# Patient Record
Sex: MALE | Race: OTHER | Employment: OTHER | URBAN - METROPOLITAN AREA
[De-identification: names, ages, dates, MRNs, and addresses within clinical notes are randomized per-mention and may not be internally consistent; named-entity substitution may affect disease eponyms.]

---

## 2017-01-13 ENCOUNTER — OFFICE VISIT (OUTPATIENT)
Dept: FAMILY MEDICINE CLINIC | Age: 37
End: 2017-01-13

## 2017-01-13 ENCOUNTER — HOSPITAL ENCOUNTER (OUTPATIENT)
Dept: GENERAL RADIOLOGY | Age: 37
Discharge: HOME | End: 2017-01-13
Payer: SUBSIDIZED

## 2017-01-13 VITALS
BODY MASS INDEX: 28.38 KG/M2 | HEART RATE: 62 BPM | WEIGHT: 191.6 LBS | TEMPERATURE: 97.8 F | SYSTOLIC BLOOD PRESSURE: 131 MMHG | DIASTOLIC BLOOD PRESSURE: 82 MMHG | HEIGHT: 69 IN

## 2017-01-13 DIAGNOSIS — Z13.9 SCREENING: ICD-10-CM

## 2017-01-13 DIAGNOSIS — M54.40 LOW BACK PAIN WITH SCIATICA, SCIATICA LATERALITY UNSPECIFIED, UNSPECIFIED BACK PAIN LATERALITY, UNSPECIFIED CHRONICITY: ICD-10-CM

## 2017-01-13 DIAGNOSIS — M54.40 LOW BACK PAIN WITH SCIATICA, SCIATICA LATERALITY UNSPECIFIED, UNSPECIFIED BACK PAIN LATERALITY, UNSPECIFIED CHRONICITY: Primary | ICD-10-CM

## 2017-01-13 DIAGNOSIS — Z71.89 COUNSELING AND COORDINATION OF CARE: Primary | ICD-10-CM

## 2017-01-13 LAB — HGB BLD-MCNC: 14.8 G/DL

## 2017-01-13 PROCEDURE — 72100 X-RAY EXAM L-S SPINE 2/3 VWS: CPT

## 2017-01-13 RX ORDER — CYCLOBENZAPRINE HCL 10 MG
10 TABLET ORAL
Qty: 30 TAB | Refills: 1 | Status: SHIPPED | OUTPATIENT
Start: 2017-01-13 | End: 2017-01-19

## 2017-01-13 RX ORDER — DICLOFENAC SODIUM 75 MG/1
75 TABLET, DELAYED RELEASE ORAL 2 TIMES DAILY
Qty: 60 TAB | Refills: 1 | Status: SHIPPED | OUTPATIENT
Start: 2017-01-13 | End: 2017-02-21 | Stop reason: SDUPTHER

## 2017-01-13 NOTE — MR AVS SNAPSHOT
Visit Information Date & Time Provider Department Dept. Phone Encounter #  
 1/13/2017  9:00 AM Misti Hill MD [facility] [phone] [id number] Follow-up Instructions Return in about 4 weeks (around 2/10/2017) for appt. Upcoming Health Maintenance Date Due DTaP/Tdap/Td series (1 - Tdap) 9/2/2002 INFLUENZA AGE 9 TO ADULT 8/1/2016 Allergies as of 1/13/2017  Review Complete On: 1/13/2017 By: Aurelio Pyle No Known Allergies Current Immunizations  Never Reviewed No immunizations on file. Not reviewed this visit You Were Diagnosed With   
  
 Codes Comments Low back pain with sciatica, sciatica laterality unspecified, unspecified back pain laterality, unspecified chronicity    -  Primary ICD-10-CM: M54.40 ICD-9-CM: 724.3 Vitals BP Pulse Temp Height(growth percentile) Weight(growth percentile) BMI  
 131/82 (BP 1 Location: Right arm, BP Patient Position: Sitting) 62 97.8 °F (36.6 °C) (Oral) 5' 8.7\" (1.745 m) 191 lb 9.6 oz (86.9 kg) 28.54 kg/m2 Smoking Status Never Smoker Vitals History BMI and BSA Data Body Mass Index Body Surface Area 28.54 kg/m 2 2.05 m 2 Preferred Pharmacy Pharmacy Name Phone [facility] [address] [place] [address] [phone] Your Updated Medication List  
  
   
This list is accurate as of: 1/13/17 10:00 AM.  Always use your most recent med list.  
  
  
  
  
 cyclobenzaprine 10 mg tablet Commonly known as:  FLEXERIL Take 1 Tab by mouth nightly. diclofenac EC 75 mg EC tablet Commonly known as:  VOLTAREN Take 1 Tab by mouth two (2) times a day. Prescriptions Sent to Pharmacy Refills  
 diclofenac EC (VOLTAREN) 75 mg EC tablet 1 Sig: Take 1 Tab by mouth two (2) times a day. Class: Normal  
 Pharmacy: [address] [facility], [address] [facility] #: [phone] Route: Oral  
 cyclobenzaprine (FLEXERIL) 10 mg tablet 1 Sig: Take 1 Tab by mouth nightly. Class: Normal  
 Pharmacy: [address], [address] [facility] [address] Winifred Brice Ph #: [phone] Route: Oral  
  
We Performed the Following REFERRAL TO SPINE SURGERY [AUG277 Custom] Comments:  
 Access Now Referral Request Form: 
 
Has the patient live in the area for 6 months Yes Is the patient here on a visa No 
 
Priority: 
 
3 weeks Location: [facility] Patient Demographics: 
 
Date:  1/13/2017                          EMR# [id number] Jennifer Donnelly 1980 Home Phone : [phone]             Cell Phone:   
 
Language :  [place] Specialist Requested:  Ortho spine Reason for Request:  Severe lbp unresponsive to conservative tx Specialist Requirements: 
 
If GYN Referral:   
Pap: n/a If Ortho Referral: MRI no      
XRAY: yes Pulmonary Referrals must have :  
C-X-ray n/a OR  
CT Scan n/a Referring Provider:  Camron Sterling MD  
  
Follow-up Instructions Return in about 4 weeks (around 2/10/2017) for appt. To-Do List   
 01/13/2017 Imaging:  XR SPINE LUMB 2 OR 3 V Referral Information Referral ID Referred By Referred To  
  
 [id number] Telma Sexton Not Available Visits Status Start Date End Date 1 New Request 1/13/17 1/13/18 If your referral has a status of pending review or denied, additional information will be sent to support the outcome of this decision. Introducing [facility]! Belkis Rodriges introduces [employer] patient portal. Now you can access parts of your medical record, email your doctor's office, and request medication refills online. 1. In your internet browser, go to [email or web address] 2. Click on the First Time User? Click Here link in the Sign In box. You will see the New Member Sign Up page. 3. Enter your [employer] Access Code exactly as it appears below. You will not need to use this code after youve completed the sign-up process. If you do not sign up before the expiration date, you must request a new code. · [employer] Access Code: [email or web address] Expires: 4/13/2017 10:00 AM 
 
4. Enter the last four digits of your Social Security Number (xxxx) and Date of Birth (mm/dd/yyyy) as indicated and click Submit. You will be taken to the next sign-up page. 5. Create a [employer] ID. This will be your [employer] login ID and cannot be changed, so think of one that is secure and easy to remember. 6. Create a [employer] password. You can change your password at any time. 7. Enter your Password Reset Question and Answer. This can be used at a later time if you forget your password. 8. Enter your e-mail address. You will receive e-mail notification when new information is available in [address]. 9. Click Sign Up. You can now view and download portions of your medical record. 10. Click the Download Summary menu link to download a portable copy of your medical information. If you have questions, please visit the Frequently Asked Questions section of the [employer] website. Remember, [employer] is NOT to be used for urgent needs. For medical emergencies, dial 911. Now available from your iPhone and Android! Please provide this summary of care documentation to your next provider. If you have any questions after today's visit, please call [phone].

## 2017-01-13 NOTE — PROGRESS NOTES
HISTORY OF PRESENT ILLNESS  Cesar Sanford is a 29 y.o. male. 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 Initially had episodes of what sounds like spasms lasting several days, then up to 2 weeks, and most recently has had severe pain now since 12/25. He doesn't get any help from naproxen now, has gotten some relief from muscle relaxants, has had PT and chiropractic, acupuncture, none of which provided any long term relief. Works at [employer] of a spine surgeon who told him he should see an ortho as next step, which is what he would like to do. Has LBP pain with erections also recently. Ros + for brbpr recently. .    Back Pain          Review of Systems   Musculoskeletal: Positive for back pain. Physical Exam   Constitutional: He appears well-developed and well-nourished. He appears distressed. Musculoskeletal:   Antalgic gait and doesn't want to sit down. Decreased back rom due to pain, especially lat flexion to the right and extension.  + LSLR. Tender lower lumbar paraspinous muscles mostly on left. ASSESSMENT and PLAN  Severe low back pain, has tried most modalities except epidural injections. Refer to spine surgery. Hematochezia, which sounds to be rectal.  F/u 1 month and will evaluate further. If sx increase, f/u sooner or go to ED.

## 2017-01-13 NOTE — PROGRESS NOTES
Met with Dinesh Schaeffer and completed Access Now application pending income verification.   Diane Frost

## 2017-01-13 NOTE — PROGRESS NOTES
Statements below were documented by Gallito Reynolds discharged home with AVS and Medication teaching . No further questions. Reviewed patient's medications, how to take, where to pickup and if any refills, patient verbalized understanding and has no questionsPatient given information about care card because the test ordered will be billed to patient . Patient explained to answer phone because a call would be coming in from [facility] to start the financial screening process fro Care Card. Patient verbalized understanding and has no questions . Patient explained that when bill is received to please call the number on the bill and explain that they are working on getting the care card. Patient acknowleged and understands the process without aany questions . Patient informed that if [facility] does not call to contact them in 3 weeks  to please give them a call Patient to see registrar to make follow up appointment. Clemente Damon RN

## 2017-01-13 NOTE — PROGRESS NOTES
Results for orders placed or performed in visit on 01/13/17   AMB POC HEMOGLOBIN (HGB)   Result Value Ref Range    Hemoglobin (POC) 14.8

## 2017-01-18 ENCOUNTER — DOCUMENTATION ONLY (OUTPATIENT)
Dept: FAMILY MEDICINE CLINIC | Age: 37
End: 2017-01-18

## 2017-01-19 ENCOUNTER — DOCUMENTATION ONLY (OUTPATIENT)
Dept: FAMILY MEDICINE CLINIC | Age: 37
End: 2017-01-19

## 2017-01-19 ENCOUNTER — HOSPITAL ENCOUNTER (EMERGENCY)
Age: 37
Discharge: HOME | End: 2017-01-19
Attending: EMERGENCY MEDICINE
Payer: SUBSIDIZED

## 2017-01-19 VITALS
DIASTOLIC BLOOD PRESSURE: 79 MMHG | SYSTOLIC BLOOD PRESSURE: 128 MMHG | OXYGEN SATURATION: 100 % | HEART RATE: 71 BPM | WEIGHT: 190 LBS | BODY MASS INDEX: 29.82 KG/M2 | RESPIRATION RATE: 16 BRPM | TEMPERATURE: 97.9 F | HEIGHT: 67 IN

## 2017-01-19 DIAGNOSIS — M54.32 SCIATICA OF LEFT SIDE: Primary | ICD-10-CM

## 2017-01-19 PROCEDURE — 74011250636 HC RX REV CODE- 250/636: Performed by: EMERGENCY MEDICINE

## 2017-01-19 PROCEDURE — 96372 THER/PROPH/DIAG INJ SC/IM: CPT

## 2017-01-19 PROCEDURE — 74011636637 HC RX REV CODE- 636/637: Performed by: EMERGENCY MEDICINE

## 2017-01-19 PROCEDURE — 74011250637 HC RX REV CODE- 250/637: Performed by: EMERGENCY MEDICINE

## 2017-01-19 PROCEDURE — 99283 EMERGENCY DEPT VISIT LOW MDM: CPT

## 2017-01-19 RX ORDER — PREDNISONE 20 MG/1
60 TABLET ORAL
Status: COMPLETED | OUTPATIENT
Start: 2017-01-19 | End: 2017-01-19

## 2017-01-19 RX ORDER — METHYLPREDNISOLONE 4 MG/1
TABLET ORAL
Qty: 1 DOSE PACK | Refills: 0 | Status: SHIPPED | OUTPATIENT
Start: 2017-01-19 | End: 2017-06-10

## 2017-01-19 RX ORDER — KETOROLAC TROMETHAMINE 30 MG/ML
60 INJECTION, SOLUTION INTRAMUSCULAR; INTRAVENOUS
Status: COMPLETED | OUTPATIENT
Start: 2017-01-19 | End: 2017-01-19

## 2017-01-19 RX ORDER — METHOCARBAMOL 750 MG/1
750 TABLET, FILM COATED ORAL
Qty: 20 TAB | Refills: 0 | Status: SHIPPED | OUTPATIENT
Start: 2017-01-19 | End: 2017-01-25

## 2017-01-19 RX ORDER — HYDROCODONE BITARTRATE AND ACETAMINOPHEN 5; 325 MG/1; MG/1
1 TABLET ORAL
Qty: 20 TAB | Refills: 0 | Status: SHIPPED | OUTPATIENT
Start: 2017-01-19 | End: 2017-02-10 | Stop reason: SDUPTHER

## 2017-01-19 RX ORDER — HYDROCODONE BITARTRATE AND ACETAMINOPHEN 5; 325 MG/1; MG/1
1 TABLET ORAL
Status: COMPLETED | OUTPATIENT
Start: 2017-01-19 | End: 2017-01-19

## 2017-01-19 RX ADMIN — PREDNISONE 60 MG: 20 TABLET ORAL at 19:48

## 2017-01-19 RX ADMIN — KETOROLAC TROMETHAMINE 60 MG: 30 INJECTION, SOLUTION INTRAMUSCULAR at 19:48

## 2017-01-19 RX ADMIN — HYDROCODONE BITARTRATE AND ACETAMINOPHEN 1 TABLET: 5; 325 TABLET ORAL at 19:48

## 2017-01-19 NOTE — ED TRIAGE NOTES
Pt presents with lower back pain that radiates down left leg. Pt states he was given medicine before without any relief.

## 2017-01-19 NOTE — Clinical Note
- Continue Diclofenc. 
- Medrol dose pack as prescribed. - Stop Cyclobenzaprine and change to Robaxin for muscle relaxant. 
- Diclofenac as prescribed. Robaxin for muscle relaxant. Norco for continued pain. - Please follow-up with Dr. Stalin Price for ortho re Maximilian Bland are also welcome to arrange follow-up directly with [facility]. 
- Return to ED for fever, increased pain, trouble with bowel or bladder, any other concerns.

## 2017-01-19 NOTE — PROGRESS NOTES
Kelvin Tobar called and spoke to CARMELA Burch Kidney. Kelvin Tobar states that medication he was given is not working and his pain is now from his back down into his legs. Patient's chart reviewed, and patient was recommended to return to the ED, for further evaluation. Davon Almonte LPN

## 2017-01-20 NOTE — ED PROVIDER NOTES
HPI Comments: 29 y.o. male with no significant past medical history who presents from home accompanied by relative with chief complaint of low back pain. Pt reports acute on chronic exacerbation of low back pain x 1 day. Pt describes symtpoms as 10/10 pain which radiates from low back, between buttocks and down left leg intermittently accompanied by numbness to lateral aspect of LLE to toes with feeling of unsteady gait. Pt reports pain is exacerbated by coughing and with bowel movement. Pt has been gradually worsening since age 24 (13 years) but is typically controlled by OTC pain medications. He was prescribed Volteran and Flexeril 6 days ago but denies any relief. Pt denies urinary symptoms, chest pain, SOB or abdominal pain. There are no other acute medical concerns at this time. Old chart review: Pt seen by Dr. Darian Mcgarry (PCP) 1/13/17 for chronic low back pain. Relief in the past from muscle relaxers. Pt has tried PT, Chiropractor and acupuncture without relief. Pt was been referred to spine surgery. He was discharged on Volteran and Flexeril. Lumbar spine XR x-ray from this visit was normal.     PCP: None    Note written by Praveena Riojas, as dictated by Balaji Waters MD 7:14 PM    The history is provided by the patient and a relative. No  was used. No past medical history on file. No past surgical history on file. No family history on file. Social History     Social History    Marital status:      Spouse name: N/A    Number of children: N/A    Years of education: N/A     Occupational History    Not on file. Social History Main Topics    Smoking status: Never Smoker    Smokeless tobacco: Not on file    Alcohol use Yes      Comment: occ.     Drug use: No    Sexual activity: Not on file     Other Topics Concern    Not on file     Social History Narrative    No narrative on file         ALLERGIES: Review of patient's allergies indicates no known allergies. Review of Systems   Constitutional: Negative for appetite change and fever. HENT: Negative for congestion, nosebleeds and sore throat. Eyes: Negative for discharge. Respiratory: Negative for cough and shortness of breath. Cardiovascular: Negative for chest pain. Gastrointestinal: Negative for abdominal pain, constipation, diarrhea, nausea and vomiting. Genitourinary: Negative for difficulty urinating and dysuria. Musculoskeletal: Positive for back pain (low, radiating) and gait problem (unsteady). Negative for neck pain. Skin: Negative for rash. Neurological: Positive for numbness (L foot). Negative for weakness and headaches. Hematological: Negative for adenopathy. Psychiatric/Behavioral: Negative. All other systems reviewed and are negative. Vitals:    01/19/17 1841   BP: 128/79   Pulse: 71   Resp: 16   Temp: 97.9 °F (36.6 °C)   SpO2: 100%   Weight: 86.2 kg (190 lb)   Height: 5' 7\" (1.702 m)          Physical Exam   Constitutional: He is oriented to person, place, and time. He appears well-developed and well-nourished. HENT:   Head: Normocephalic and atraumatic. Mouth/Throat: Oropharynx is clear and moist.   Eyes: Conjunctivae are normal.   Neck: Normal range of motion. Neck supple. Cardiovascular: Normal rate, regular rhythm and normal heart sounds. Strong DP pulse L foot   Pulmonary/Chest: Effort normal and breath sounds normal.   Abdominal: Soft. Bowel sounds are normal. There is no tenderness. Musculoskeletal: He exhibits no edema.   + Straight leg raises with pain radiating down both legs. Tenderness to left low back. Strong patellar reflexes bilaterally. Neurological: He is alert and oriented to person, place, and time. Skin: Skin is warm and dry. Psychiatric: He has a normal mood and affect. His behavior is normal.   Nursing note and vitals reviewed.    Note written by Praveena Shannon, as dictated by Bridget Capellan MD 7:20 PM    [facility]  ED Course       Procedures     A/P:  1. Sciatica, left - Continue Voltaren. Add Medrol dose pack. Change flexeril to Robaxin. Norco for continued pain. F/U with PCP and/or ortho. Patient's results have been reviewed with them. Patient and/or family have verbally conveyed their understanding and agreement of the patient's signs, symptoms, diagnosis, treatment and prognosis and additionally agree to follow up as recommended or return to the Emergency Room should their condition change prior to follow-up. Discharge instructions have also been provided to the patient with some educational information regarding their diagnosis as well a list of reasons why they would want to return to the ER prior to their follow-up appointment should their condition change.

## 2017-01-20 NOTE — DISCHARGE INSTRUCTIONS
We hope that we have addressed all of your medical concerns. The examination and treatment you received in the Emergency Department were for an emergent problem and were not intended as complete care. It is important that you follow up with your healthcare provider(s) for ongoing care. If your symptoms worsen or do not improve as expected, and you are unable to reach your usual health care provider(s), you should return to the Emergency Department. Today's healthcare is undergoing tremendous change, and patient satisfaction surveys are one of the many tools to assess the quality of medical care. You may receive a survey from the [employer] regarding your experience in the Emergency Department. I hope that your experience has been completely positive, particularly the medical care that I provided. As such, please participate in the survey; anything less than excellent does not meet my expectations or intentions. [facility] and [facility] participate in nationally recognized quality of care measures. If your blood pressure is greater than 120/80, as reported below, we urge that you seek medical care to address the potential of high blood pressure, commonly known as hypertension. Hypertension can be hereditary or can be caused by certain medical conditions, pain, stress, or \"white coat syndrome. \"       Please make an appointment with your health care provider(s) for follow up of your Emergency Department visit. VITALS:   Patient Vitals for the past 8 hrs:   Temp Pulse Resp BP SpO2   01/19/17 2000 - - - - 100 %   01/19/17 1841 97.9 °F (36.6 °C) 71 16 128/79 100 %          Thank you for allowing us to provide you with medical care today. We realize that you have many choices for your emergency care needs. Please choose us in the future for any continued health care needs. Ronald Hall, [address] [facility] [facility] [address] [facility].   Office: [phone]         Sciatica: Care Instructions  Your Care Instructions    Sciatica (say \"opu-IE-oi-kuh\") is an irritation of one of the sciatic nerves, which come from the spinal cord in the lower back. The sciatic nerves and their branches extend down through the buttock to the foot. Sciatica can develop when an injured disc in the back presses against a spinal nerve root. Its main symptom is pain, numbness, or weakness that is often worse in the leg or foot than in the back. Sciatica often will improve and go away with time. Early treatment usually includes medicines and exercises to relieve pain. Follow-up care is a key part of your treatment and safety. Be sure to make and go to all appointments, and call your doctor if you are having problems. It's also a good idea to know your test results and keep a list of the medicines you take. How can you care for yourself at home? · Take pain medicines exactly as directed. ¨ If the doctor gave you a prescription medicine for pain, take it as prescribed. ¨ If you are not taking a prescription pain medicine, ask your doctor if you can take an over-the-counter medicine. · Use heat or ice to relieve pain. ¨ To apply heat, put a warm water bottle, heating pad set on low, or warm cloth on your back. Do not go to sleep with a heating pad on your skin. ¨ To use ice, put ice or a cold pack on the area for 10 to 20 minutes at a time. Put a thin cloth between the ice and your skin. · Avoid sitting if possible, unless it feels better than standing. · Alternate lying down with short walks. Increase your walking distance as you are able to without making your symptoms worse. · Do not do anything that makes your symptoms worse. When should you call for help? Call 911 anytime you think you may need emergency care. For example, call if:  · You are unable to move a leg at all.   Call your doctor now or seek immediate medical care if:  · You have new or worse symptoms in your legs or buttocks. Symptoms may include:  ¨ Numbness or tingling. ¨ Weakness. ¨ Pain. · You lose bladder or bowel control. Watch closely for changes in your health, and be sure to contact your doctor if:  · You are not getting better as expected. Where can you learn more? Go to http://ariana-jose g.info/. Enter [phone] in the search box to learn more about \"Sciatica: Care Instructions. \"  Current as of: May 23, 2016  Content Version: 11.1  © 8747-4557 [employer]. Care instructions adapted under license by [employer] (which disclaims liability or warranty for this information). If you have questions about a medical condition or this instruction, always ask your healthcare professional. [address] any warranty or liability for your use of this information. Sciatica: Exercises  Your Care Instructions  Here are some examples of typical rehabilitation exercises for your condition. Start each exercise slowly. Ease off the exercise if you start to have pain. Your doctor or physical therapist will tell you when you can start these exercises and which ones will work best for you. When you are not being active, find a comfortable position for rest. Some people are comfortable on the floor or a medium-firm bed with a small pillow under their head and another under their knees. Some people prefer to lie on their side with a pillow between their knees. Don't stay in one position for too long. Take short walks (10 to 20 minutes) every 2 to 3 hours. Avoid slopes, hills, and stairs until you feel better. Walk only distances you can manage without pain, especially leg pain. How to do the exercises  Back stretches    1. Get down on your hands and knees on the floor. 2. Relax your head and allow it to droop.  Round your back up toward the ceiling until you feel a nice stretch in your upper, middle, and lower back. Hold this stretch for as long as it feels comfortable, or about 15 to 30 seconds. 3. Return to the starting position with a flat back while you are on your hands and knees. 4. Let your back sway by pressing your stomach toward the floor. Lift your buttocks toward the ceiling. 5. Hold this position for 15 to 30 seconds. 6. Repeat 2 to 4 times. Follow-up care is a key part of your treatment and safety. Be sure to make and go to all appointments, and call your doctor if you are having problems. It's also a good idea to know your test results and keep a list of the medicines you take. Where can you learn more? Go to http://ariana-jose g.info/. Enter G150 in the search box to learn more about \"Sciatica: Exercises. \"  Current as of: May 23, 2016  Content Version: 11.1  © 7532-3715 [employer]. Care instructions adapted under license by [employer] (which disclaims liability or warranty for this information). If you have questions about a medical condition or this instruction, always ask your healthcare professional. [address] any warranty or liability for your use of this information. the grafter Activation    Thank you for requesting access to the grafter. Please follow the instructions below to securely access and download your online medical record. the grafter allows you to send messages to your doctor, view your test results, renew your prescriptions, schedule appointments, and more. How Do I Sign Up? 1. In your internet browser, go to [email or web address]  2. Click on the First Time User? Click Here link in the Sign In box. You will be redirect to the New Member Sign Up page. 3. Enter your the [employer] Access Code exactly as it appears below. You will not need to use this code after youve completed the sign-up process. If you do not sign up before the expiration date, you must request a new code.     the [employer]fter Access Code: [email or web address]  Expires: 2017 10:00 AM (This is the date your [employer] access code will )    4. Enter the last four digits of your Social Security Number (xxxx) and Date of Birth (mm/dd/yyyy) as indicated and click Submit. You will be taken to the next sign-up page. 5. Create a [employer] ID. This will be your [employer] login ID and cannot be changed, so think of one that is secure and easy to remember. 6. Create a [employer] password. You can change your password at any time. 7. Enter your Password Reset Question and Answer. This can be used at a later time if you forget your password. 8. Enter your e-mail address. You will receive e-mail notification when new information is available in [address] 9. Click Sign Up. You can now view and download portions of your medical record. 10. Click the Download Summary menu link to download a portable copy of your medical information. Additional Information    If you have questions, please visit the Frequently Asked Questions section of the [employer] website at [email or web address] Remember, [employer] is NOT to be used for urgent needs. For medical emergencies, dial 911.

## 2017-01-24 ENCOUNTER — TELEPHONE (OUTPATIENT)
Dept: FAMILY MEDICINE CLINIC | Age: 37
End: 2017-01-24

## 2017-01-24 NOTE — TELEPHONE ENCOUNTER
Patient has sciatica and he said Dr. Sonja Aranda gave him some pills that helped the pain a lot. He is now out of the medicine and his next appointment is 2/10. Is it possible to give him some more of the same medication to last him until his next visit?     Karen Rayo

## 2017-01-24 NOTE — TELEPHONE ENCOUNTER
Attempted to return call on both numbers in system. Home number has an identified name that is not the pt and spouse number did not answer or accept messages. Call were assisted by [email or web address] phone  # [phone].

## 2017-01-25 ENCOUNTER — TELEPHONE (OUTPATIENT)
Dept: FAMILY MEDICINE CLINIC | Age: 37
End: 2017-01-25

## 2017-01-25 ENCOUNTER — OFFICE VISIT (OUTPATIENT)
Dept: FAMILY MEDICINE CLINIC | Age: 37
End: 2017-01-25

## 2017-01-25 VITALS
SYSTOLIC BLOOD PRESSURE: 123 MMHG | BODY MASS INDEX: 29.29 KG/M2 | DIASTOLIC BLOOD PRESSURE: 93 MMHG | HEART RATE: 76 BPM | WEIGHT: 187 LBS | TEMPERATURE: 97.8 F

## 2017-01-25 DIAGNOSIS — G89.29 CHRONIC LEFT-SIDED LOW BACK PAIN WITH LEFT-SIDED SCIATICA: Primary | ICD-10-CM

## 2017-01-25 DIAGNOSIS — M54.42 CHRONIC LEFT-SIDED LOW BACK PAIN WITH LEFT-SIDED SCIATICA: Primary | ICD-10-CM

## 2017-01-25 NOTE — TELEPHONE ENCOUNTER
RN consulted with Dr. Sybil Garvey who advised that pt could be seen for follow up this evening at [facility]. Dr. Sybil Garvey stated that the [facility] does not prescribe narcotics. She noted that since pt is ineligible for Access Now, the plan for pt is for referral to [address] [place] [address] or [facility]. RN called pt and scheduled appt for him this evening @ 7:00PM with Dr. Maeve Vazquez. RN provided the address for pt. RN also advised him that he is ineligible for Access Now due to being over income, and that Dr. Maeve Vazquez would discuss alternate plans with him. Pt expressed understanding of the above information. James Stephens.

## 2017-01-25 NOTE — TELEPHONE ENCOUNTER
Patient needs triage concerning his present condition. He has a 2/10 appointment but can't wait to see a doctor till then.

## 2017-01-25 NOTE — TELEPHONE ENCOUNTER
RN returned call to pt. Pt reported that he took 4 days off from work after going to the ED on 1/19/17 to rest, hoping that he would feel better. He has been taking Hydrocodone-acetaminophen q 4 hours, and this lessens his pain for almost 4 hours. He rated his pain 7/10 after taking the Hydrocodone, and then it returns to 10/10 after 3-4 hours. He also finished the prednisone today, and has been taking the Robaxin every 6 hours. He has returned to work today, but is concerned that his pain has not lessened except when he is taking the medication, and then it is reduced only enough to allow him to be functional.  He describes the pain as \"pinching\" from lower back, down left side of buttocks and down left leg. He also stated that he has numbness on bottom of his left foot from the middle to the toes. Currently, he has 2 pills of Hydrocodone left, and 4 tabs of Robaxin. He has a follow up appt on 2/10/17 with MARLENE Hughes, but he does not feel he can wait that long for relief and is concerned that he will run out of medication. RN advised pt that this information would be sent to the provider for review and advice. There is a note by Tatum Novak that states that pt is ineligible for Access Now as he is over  Income. Pt stated that  He would prefer not to go to ED again. RN advised that she will try to call him back today. Forwarding this note to MARLENE Hughes, and Dr Daniel Britt for review. Pt expressed understanding of the above information. Cecilia Brown.

## 2017-01-25 NOTE — MR AVS SNAPSHOT
Visit Information Date & Time Provider Department Dept. Phone Encounter #  
 1/25/2017  7:00 PM Roosevelt Dempsey MD [facility] [address] [facility] [phone] [id number] Your Appointments 2/10/2017 12:30 PM  
[facility] Appt Note: Follow up (4 weeks) per  by MADHU [address] [facility] 7 [address]  
[phone]  
  
   
 [address] [facility] 7 [address] Upcoming Health Maintenance Date Due DTaP/Tdap/Td series (1 - Tdap) 9/2/2002 INFLUENZA AGE 9 TO ADULT 8/1/2016 Allergies as of 1/25/2017  Review Complete On: 1/25/2017 By: Roosevelt Dempsey MD  
 No Known Allergies Current Immunizations  Never Reviewed No immunizations on file. Not reviewed this visit Vitals BP Pulse Temp Weight(growth percentile) BMI Smoking Status (!) 123/93 (BP 1 Location: Left arm, BP Patient Position: Sitting) 76 97.8 °F (36.6 °C) (Oral) 187 lb (84.8 kg) 29.29 kg/m2 Never Smoker Vitals History BMI and BSA Data Body Mass Index Body Surface Area  
 29.29 kg/m 2 2 m 2 Preferred Pharmacy Pharmacy Name Phone [facility] [address] [place] [address] [phone] Your Updated Medication List  
  
   
This list is accurate as of: 1/25/17  8:24 PM.  Always use your most recent med list.  
  
  
  
  
 diclofenac EC 75 mg EC tablet Commonly known as:  VOLTAREN Take 1 Tab by mouth two (2) times a day. HYDROcodone-acetaminophen 5-325 mg per tablet Commonly known as:  Benetta Pock Take 1 Tab by mouth every four (4) hours as needed for Pain. Max Daily Amount: 6 Tabs. methocarbamol 750 mg tablet Commonly known as:  [id number] Take 1 Tab by mouth every six (6) hours as needed for up to 5 days. methylPREDNISolone 4 mg tablet Commonly known as:  MEDROL (DULCE) Take as instructed. Introducing [facility]! [facility] introduces [employer] patient portal. Now you can access parts of your medical record, email your doctor's office, and request medication refills online. 1. In your internet browser, go to [email or web address] 2. Click on the First Time User? Click Here link in the Sign In box. You will see the New Member Sign Up page. 3. Enter your [employer] Access Code exactly as it appears below. You will not need to use this code after youve completed the sign-up process. If you do not sign up before the expiration date, you must request a new code. · [employer] Access Code: [email or web address] Expires: 4/13/2017 10:00 AM 
 
4. Enter the last four digits of your Social Security Number (xxxx) and Date of Birth (mm/dd/yyyy) as indicated and click Submit. You will be taken to the next sign-up page. 5. Create a [employer] ID. This will be your [employer] login ID and cannot be changed, so think of one that is secure and easy to remember. 6. Create a [employer] password. You can change your password at any time. 7. Enter your Password Reset Question and Answer. This can be used at a later time if you forget your password. 8. Enter your e-mail address. You will receive e-mail notification when new information is available in [address] 9. Click Sign Up. You can now view and download portions of your medical record. 10. Click the Download Summary menu link to download a portable copy of your medical information. If you have questions, please visit the Frequently Asked Questions section of the [employer] website. Remember, [employer] is NOT to be used for urgent needs. For medical emergencies, dial 911. Now available from your iPhone and Android! Please provide this summary of care documentation to your next provider. Your primary care clinician is listed as NONE. If you have any questions after today's visit, please call [phone].

## 2017-01-26 DIAGNOSIS — M54.40 ACUTE MIDLINE LOW BACK PAIN WITH SCIATICA, SCIATICA LATERALITY UNSPECIFIED: Primary | ICD-10-CM

## 2017-01-26 NOTE — TELEPHONE ENCOUNTER
Per chart review, Dr. JODI BUTLER [facility] has entered the order. I called Central scheduling and scheduled the MRI for tomorrow, 01-27-17 at [facility] at 2:30 pm with an arrival time of 2pm in outpatient registration. The full MRI screening sheet will be completed at the appointment. I answered as many of the screening questions for the  as I could from the information in the chart and explained that the patient was not with me at the time of the conversation. I then called the patient with the assistance of [employer]  # [id number] and he confirmed that  he could make the MRI appointment tomorrow and understands not to wear any metal in his hair or any jewelry. We also discussed asking for the Care Card application at registration and that he would hear from a [facility] provider or nurse about the results of the test. I gave him the address for [facility] and asked him to please call the [facility] office if he had any further needs. The patient expressed understanding of the instructions.  Evette Quezada RN

## 2017-01-26 NOTE — PROGRESS NOTES
Coordination of Care  1. Have you been to the ER, urgent care clinic since your last visit? Hospitalized since your last visit? Yes When: [facility], 01/19/2017, sciatica    2. Have you seen or consulted any other health care providers outside of the [address] [place] [address] since your last visit? Include any pap smears or colon screening. No    Medications  Medication Reconciliation Performed: no  Patient does need refills     Learning Assessment Complete?  yes

## 2017-01-26 NOTE — PROGRESS NOTES
Printed AVS, provided to pt and reviewed. Pt indicated understanding and had no questions. A message was sent to call back nurse to schedule an MRI for the pt due to the Central Scheduling is closed at this time.  Caryle Killer, RN

## 2017-01-26 NOTE — TELEPHONE ENCOUNTER
Order needs to be placed by Dr Roberto Carlos Cash before it can be scheduled. Routing to Dr Roberto Carlos Cash for review. Please respond to Call Back.

## 2017-01-26 NOTE — PROGRESS NOTES
S/  Sig low back pain with rad sx    O/ alert but in pain    Back - lumbar tenderness  Neuro- dtr ok    A/ likely lumbar disc dis or herniation    P/ Arrange lumbar mri       Gave pt lortab5/325 one bid 60 no refills and flexeril 10mg one bid 60 no refill  Pt has f/u with dr Jase Hardwick on2/10

## 2017-01-27 ENCOUNTER — HOSPITAL ENCOUNTER (OUTPATIENT)
Dept: MRI IMAGING | Age: 37
Discharge: HOME | End: 2017-01-27
Attending: FAMILY MEDICINE
Payer: SUBSIDIZED

## 2017-01-27 DIAGNOSIS — M54.40 ACUTE MIDLINE LOW BACK PAIN WITH SCIATICA, SCIATICA LATERALITY UNSPECIFIED: ICD-10-CM

## 2017-01-27 PROCEDURE — 72148 MRI LUMBAR SPINE W/O DYE: CPT

## 2017-01-30 ENCOUNTER — TELEPHONE (OUTPATIENT)
Dept: FAMILY MEDICINE CLINIC | Age: 37
End: 2017-01-30

## 2017-01-30 NOTE — TELEPHONE ENCOUNTER
Patient said he was returning Luisa's call. Please call him. He is anxious to talk with someone regarding some tests and his pain.     Karen Rayo

## 2017-01-30 NOTE — TELEPHONE ENCOUNTER
RN returned call to pt who stated that Chidi Brown RN, spoke with him at the [facility] on 1/25/17, so he was calling her back regarding his MRI that was completed on 1/27/17. Pt stated that he is still having severe pain although he is taking Lortab 5/325mg every 5 hours at night and every 4 hours during the day. He is taking Flexeril 10 mg bid. He would like to know what the future plan is for him and whether he will need to keep his appt on 2/10/17 with Dr. Natasha Candelaria. RN advised pt that this message will be routed to Dr. Natasha Candelaria who will respond after reading the report. Pt expressed understanding of the above information. Christen Romberg.

## 2017-02-01 ENCOUNTER — TELEPHONE (OUTPATIENT)
Dept: FAMILY MEDICINE CLINIC | Age: 37
End: 2017-02-01

## 2017-02-01 RX ORDER — METHOCARBAMOL 750 MG/1
TABLET, FILM COATED ORAL
Qty: 30 TAB | Refills: 1 | Status: SHIPPED | OUTPATIENT
Start: 2017-02-01 | End: 2017-06-10

## 2017-02-01 NOTE — PROGRESS NOTES
Left him another message and added that if he is in a lot of pain, he can try going to [facility] ED and they might be able to start the financial screen process if he has SSN and might be able to get him appt with the spine specialist he needs to see. He can also try recontacting me tomorrow prn prior to appt 2/7.

## 2017-02-01 NOTE — PROGRESS NOTES
Attempted to reach him by phone, THO on VM. Also replied to his MyChart message regarding test results. We need to get him in to [facility] and I told him in writing how to go through financial screening at [address] [place] [address]. I am continuing to try to reach him by phone.

## 2017-02-01 NOTE — TELEPHONE ENCOUNTER
I called the pt at the other phone number and spoke with him. He wants a refill on robaxin, ?given by Dr. Osmany Aguirre. Is taking 1 flexeril at hs and 1 750mg robaxin in the am, using the hydrocodone sparingly. He will consider going to [facility] ED if the pain is intolerable. Is also going to go to financial screener at [address] and if he doesn't get appt with neurosurg or ortho through the ED, will need help with getting the appt when he has f/u with us 2/10. I am sending in refill of robaxin.

## 2017-02-04 ENCOUNTER — TELEPHONE (OUTPATIENT)
Dept: FAMILY MEDICINE CLINIC | Age: 37
End: 2017-02-04

## 2017-02-04 RX ORDER — GABAPENTIN 100 MG/1
100 CAPSULE ORAL 3 TIMES DAILY
Qty: 120 CAP | Refills: 2 | Status: SHIPPED | OUTPATIENT
Start: 2017-02-04 | End: 2017-02-20 | Stop reason: DRUGHIGH

## 2017-02-04 NOTE — TELEPHONE ENCOUNTER
----- Message from Mario Merchant sent at 2017  7:22 PM EST -----  Regarding: RE: Test Results Question  Contact: [phone]    Manju Hernandez . this  Mario Merchant ; 1980 I went to the [address] [place] [address] today as your suggested  and I did see an especialist a DR how is neurosurgery, he comment me to start a soon as possible PT/OT GABAPENTIN . through you basically you are my regular DR and have more info about me and my case. and also need to provide you some information, what you  need to see ,and be shared with the  new DR example her name and phone# ,so that you can sent her and get in tuch things like the result of my test and medical history info you know. for my next doctor appointment in [address] [place] [address]. so I lake to talk to you please call me elke you have chance [phone] thanks sincerely Hola Jean    ----- Message -----  From: Dee Camargo MD  Sent: 2017  9:29 AM EST  To: Mario Merchant  Subject: RE: Test Results Question    I just tried to call you and had to leave a message. You do have a large slipped disc in your back, and I would like to get you to a U spine specialist.  The first thing you need to do to be seen there without insurance is go through financial screening. Go to the [place] [address] at [address] and [facility], and ask inside for financial screening. Bring all of your documents and information about how much you make at your job, [address] forms etc.  Once you have a letter from them saying what code you are on, we will help get you in as fast as possible. So you could call or write to me at that time and I will send all of your medical info to the doctor there.    ----- Message -----     From: Mario Merchant     Sent: 2017 10:17 PM EST       To: Dee Camargo MD  Subject: Test Results Question    Manju Raymond . the next question is about my result for the past MRI test what was done to me in the past 17.  I have and appointment with you this coming 2/10/17 but the pain is increased this past  2 days my question is I don`t know what I can do or need to do I don`t think I can wait to my appointment . hopefully I can hear from you before my next appointment day with some news I know you are very busy . but I don`t wanna go to the emergency room.   benjamin ARMSTRONG 1980 thanks

## 2017-02-04 NOTE — TELEPHONE ENCOUNTER
Returned pt e-mail via phone call, please call him, he did go to [address] [place] [address], he has a lot of information to update. Has appt 3/23 at 1000 with Dr Gurinder Plasencia, a neurosurgeon. Pt reports that a [facility] Dr Cesar Garcia (no name) recommends his pcp Rx gabapentin. He reports has begun the f/s process. Pt can be reached at [phone] his cell.

## 2017-02-06 NOTE — TELEPHONE ENCOUNTER
Spoke to patient, informing him to  the recently e-scribed Neurontin  And reviewed the dosing with him, he also plans top bring office notes from [facility] with him to Friday's appt with MM. He expressed his gratitude to all who have helped.

## 2017-02-08 NOTE — TELEPHONE ENCOUNTER
With the assistance of [employer]  # [id number], attempted to return the patient's call. There was no answer at his home or cell phone numbers, so a generic message was left on each that the [facility] was returning his call.  Edmar Suarez RN

## 2017-02-08 NOTE — TELEPHONE ENCOUNTER
Patient left message that he is not sure where to go for tests and he also said he has some questions about his symptoms he would like to discuss with someone.     Jhon Rayo

## 2017-02-08 NOTE — TELEPHONE ENCOUNTER
Return call made to patient to let him know that per Dr. Marie Lubin, he needs to go to his appointment on Friday, 02-10-17, with Mozelle Gilford at [facility]. We reviewed the address for the [facility] clinic and that if the pain becomes unbearable or he develops other symptoms, that he should go to the [facility] ER. The patient expressed understanding.  Surya Hernandez RN

## 2017-02-08 NOTE — TELEPHONE ENCOUNTER
Return call made to patient. He states he has been taking the recently prescribed Gabapentin and his pain medicines, but he is \"dying of pain and can't get out of bed\". He insists that Dr. Sonja Aranda sent him a mychart message recently about seeing a specialist this Friday instead of coming to his appointment with Flory Hardy at [facility]. He has an appointment with a U neuro specialist on 03/23/17, but thinks he is also supposed to see another type of specialist this Friday. There is no mention in CC of any other appointments. The patient stated the medicine may be confusing his memory and he wants to be sure he has the correct instructions. Per his request, routing his concerns and question to Dr. Sonja Aranda for review.  Curtis Lopez RN

## 2017-02-10 ENCOUNTER — OFFICE VISIT (OUTPATIENT)
Dept: FAMILY MEDICINE CLINIC | Age: 37
End: 2017-02-10

## 2017-02-10 VITALS
WEIGHT: 187 LBS | TEMPERATURE: 97.5 F | OXYGEN SATURATION: 100 % | HEART RATE: 66 BPM | DIASTOLIC BLOOD PRESSURE: 84 MMHG | SYSTOLIC BLOOD PRESSURE: 134 MMHG | BODY MASS INDEX: 29.29 KG/M2

## 2017-02-10 DIAGNOSIS — M54.42 CHRONIC LEFT-SIDED LOW BACK PAIN WITH LEFT-SIDED SCIATICA: Primary | ICD-10-CM

## 2017-02-10 DIAGNOSIS — G89.29 CHRONIC LEFT-SIDED LOW BACK PAIN WITH LEFT-SIDED SCIATICA: Primary | ICD-10-CM

## 2017-02-10 RX ORDER — OMEPRAZOLE 20 MG/1
20 CAPSULE, DELAYED RELEASE ORAL DAILY
Qty: 30 CAP | Refills: 2 | Status: SHIPPED | OUTPATIENT
Start: 2017-02-10 | End: 2017-06-15 | Stop reason: ALTCHOICE

## 2017-02-10 RX ORDER — HYDROCODONE BITARTRATE AND ACETAMINOPHEN 5; 325 MG/1; MG/1
1 TABLET ORAL
Qty: 20 TAB | Refills: 0 | Status: SHIPPED | OUTPATIENT
Start: 2017-02-10 | End: 2017-03-02

## 2017-02-10 NOTE — PROGRESS NOTES
Coordination of Care  1. Have you been to the ER, urgent care clinic since your last visit? Hospitalized since your last visit? Yes When: [facility], 01/2017, back pain    2. Have you seen or consulted any other health care providers outside of the [address] [place] [address] since your last visit? Include any pap smears or colon screening. [facility], 01/2017, back pain      Medications  Medication Reconciliation Performed: no  Patient does not need refills     Learning Assessment Complete?  yes

## 2017-02-10 NOTE — PROGRESS NOTES
Assessment/Plan:    Audelia Rashid was seen today for back pain. Diagnoses and all orders for this visit:    Chronic left-sided low back pain with left-sided sciatica  -     HYDROcodone-acetaminophen (NORCO) 5-325 mg per tablet; Take 1 Tab by mouth every four (4) hours as needed for Pain. Max Daily Amount: 6 Tabs. -     omeprazole (PRILOSEC) 20 mg capsule; Take 1 Cap by mouth daily. Will send message to our medical director to see if she can think of any other way to help pt navigate through this system with more urgency. Follow-up Disposition:  Return in about 4 weeks (around 3/10/2017) for with dr Bernard Rachel. SHARATH Estrada July expressed understanding of this plan. An AVS was printed and given to the patient.      ----------------------------------------------------------------------    Chief Complaint   Patient presents with    Back Pain     f/u       History of Present Illness:  Pt is here for f/up on his back pain due to spinal stenosis and disc bulge L4-L5. Since his last appt here, he has been seen by neurosurgery at [facility] for initial (brief) consultation. At that meeting, in the ER, they were able to review his MRI and suggested that the pt start on an upward tapering dose of gabapentin. The pt is now on gabapentin 300 mg 2 po tid. He has not noticed any relief of his pain but maybe a slight decrease in the tingling down his left leg. The pt stood stooped over the counter with his left leg hanging in the stair well throughout our 30 min appt today. This relieves some of the pain he states. Otherwise, the only way he can relieve the pain is by taking the norco. He states that the diclofenac, flexeril and robaxin did nothing for his pain. He is now developing more persistent epigastric pain due to the use of the medications.  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 His follow up at [facility] is at this time scheduled for 3/23/17 with Dr Stephan Leon. No past medical history on file. Current Outpatient Prescriptions   Medication Sig Dispense Refill    HYDROcodone-acetaminophen (NORCO) 5-325 mg per tablet Take 1 Tab by mouth every four (4) hours as needed for Pain. Max Daily Amount: 6 Tabs. 20 Tab 0    omeprazole (PRILOSEC) 20 mg capsule Take 1 Cap by mouth daily. 30 Cap 2    gabapentin (NEURONTIN) 100 mg capsule Take 1 Cap by mouth three (3) times daily. For 1 week, then 2 tid for 1 week, then 3 tid. Watch for sleepiness. 120 Cap 2    methocarbamol (ROBAXIN) 750 mg tablet 1 in the am prn pain/muscle spasm. 30 Tab 1    methylPREDNISolone (MEDROL, DULCE,) 4 mg tablet Take as instructed. 1 Dose Pack 0    diclofenac EC (VOLTAREN) 75 mg EC tablet Take 1 Tab by mouth two (2) times a day. 60 Tab 1       Allergies   Allergen Reactions    Lidocaine Palpitations       Social History   Substance Use Topics    Smoking status: Never Smoker    Smokeless tobacco: None    Alcohol use Yes      Comment: occ. No family history on file. Physical Exam:     Visit Vitals    /84 (BP 1 Location: Right arm, BP Patient Position: Sitting)    Pulse 66    Temp 97.5 °F (36.4 °C) (Oral)    Wt 187 lb (84.8 kg)    SpO2 100%    BMI 29.29 kg/m2       A&Ox3  WDWN NAD  Respirations normal and non labored    XR Results (maximum last 3): Results from [place] encounter on 01/13/17   XR SPINE LUMB 2 OR 3 V   Narrative INDICATION: Low back pain with sciatica. Exam: AP, lateral and cone-down views of the lumbar spine. FINDINGS: There is no acute fracture or subluxation. Intervertebral disc spaces  are well maintained. Bones are well-mineralized. Soft tissues are normal.         Impression IMPRESSION: No acute fracture or subluxation. CT Results (maximum last 3): No results found for this or any previous visit.     MRI Results (maximum last 3):    Results from Hospital Encounter encounter on 01/27/17   MRI LUMB SPINE WO CONT   Narrative EXAM:  MRI LUMB SPINE WO CONT  Clinical history: Collapsed vertebra, abnormal x-ray, severe back pain      INDICATION:  Abnormal xray, collapsed vertebrae. Lumbago with sciatica,  unspecified side    COMPARISON: None    TECHNIQUE: MR imaging of the lumbar spine was performed using the following  sequences: sagittal T1, T2, STIR;  axial T1, T2.     CONTRAST:  None. FINDINGS:    There is normal alignment of the lumbar spine. Vertebral body heights are  maintained. There is a Schmorl's along the inferior endplate of L4. The conus medullaris terminates at L1. Mild congenital narrowing of the lumbar  canal.. Signal and caliber of the distal spinal cord are within normal limits. The paraspinal soft tissues are within normal limits. No aorta normal in  caliber. Proximal common iliac vessels normal in caliber as well. Lower thoracic spine: No herniation or stenosis. L1-L2:  No herniation or stenosis. L2-L3:  No herniation or stenosis. L3-L4:  Moderate central protrusion. No facet arthropathy. Canal is patent. The  foramina are patent. L4-L5:  There is a large central and left paracentral disc protrusion which  extends to the left lateral recess. Moderate to severe canal stenosis. Severe  left lateral recess stenosis. The foramina are patent. L5-S1:  Mild central disc protrusion. The canal is patent. The foramina are  patent. Impression IMPRESSION:  There is no acute fracture or dislocation. Large central left paracentral disc protrusion at L4-5 with moderate to severe  canal stenosis and severe left lateral recess stenosis at L4-5. Nuclear Medicine Results (maximum last 3): No results found for this or any previous visit. US Results (maximum last 3): No results found for this or any previous visit.

## 2017-02-10 NOTE — PROGRESS NOTES
Printed AVS, provided to pt and reviewed. Pt indicated understanding and had no questions. Told pt that rx's have been sent to pharmacy and they should be ready for  in approximately 2 hrs.reviewed all medications ordered today with the pt. Pt told to please present GoodRx. com coupon card which we provided to your pharmacy to receive discounted price. Referred to the registrar for F/U appt 4 weeks with Dr Papito Han.  Migdalia Moore RN

## 2017-02-14 ENCOUNTER — TELEPHONE (OUTPATIENT)
Dept: FAMILY MEDICINE CLINIC | Age: 37
End: 2017-02-14

## 2017-02-14 NOTE — TELEPHONE ENCOUNTER
I spoke with [facility] neurosurg and was able to get the pt in thurs feb 23 at 8:20. Needs to bring photo id and all meds, which he likely knows about. CBN, can you contact him to let him know?

## 2017-02-20 RX ORDER — GABAPENTIN 300 MG/1
CAPSULE ORAL
Qty: 60 CAP | Refills: 5 | Status: SHIPPED | OUTPATIENT
Start: 2017-02-20 | End: 2017-06-10

## 2017-02-21 RX ORDER — METHOCARBAMOL 750 MG/1
TABLET, FILM COATED ORAL
Qty: 30 TAB | Refills: 1 | Status: CANCELLED | OUTPATIENT
Start: 2017-02-21

## 2017-02-21 RX ORDER — DICLOFENAC SODIUM 75 MG/1
75 TABLET, DELAYED RELEASE ORAL 2 TIMES DAILY
Qty: 60 TAB | Refills: 1 | Status: SHIPPED | OUTPATIENT
Start: 2017-02-21

## 2017-02-21 NOTE — TELEPHONE ENCOUNTER
Patient called regarding refills for Robaxin and hydrocodone. I read note to him about going to [address] Aug, but he said he is too much pain to walk. Please call him.     Padmini Rayo

## 2017-02-21 NOTE — TELEPHONE ENCOUNTER
Addended patient call note:  Pt  asking for refills diclofenac and hydrocodone. He uses [facility] and would like a message sent to his [facility] when refills are filled please.

## 2017-02-21 NOTE — TELEPHONE ENCOUNTER
Spoke to patient that provider MM will route diclofenac to his pharmacy. Pt said he is willing to have someone  or drive him to  the hydrocodone script over at [facility] clinic tomorrow if Dr Oralia Francis is willing to print and sign, Wednesday 2/22. He has four pills left he says and sees the specialist on 2/23 at [facility].

## 2017-02-21 NOTE — PROGRESS NOTES
Diclofenac reordered. As per my earlier note, the pt (or a family member) will need to come  a script for his pain medication (hydrocodone). Per policy at [facility], care vita Ji pt's are not allowed to  Rx at [facility] which is where I am today. We are working on finding a solution to this problem for the pt.

## 2017-02-23 ENCOUNTER — TELEPHONE (OUTPATIENT)
Dept: FAMILY MEDICINE CLINIC | Age: 37
End: 2017-02-23

## 2017-02-23 NOTE — TELEPHONE ENCOUNTER
Patient is following up on a letter that he needs concerning his citizenship. Can the letter be sent via email address.

## 2017-03-01 ENCOUNTER — TELEPHONE (OUTPATIENT)
Dept: FAMILY MEDICINE CLINIC | Age: 37
End: 2017-03-01

## 2017-03-01 NOTE — TELEPHONE ENCOUNTER
Received incoming call from patient who is requesting a refill of the Hydrocodone pain medicine. He states he is using the Gabapentin and Diclofenac with some relief, but needs something stronger at times. He also states that he has been to see the specialist at [facility], but that doctor will not prescribe any pain medicine for him until after surgery. Per chart review, their was a plan last week to provide the patient with a Hydrocodone prescription, but he was not able to come to the [facility] site to pick it up. Reminded the patient that narcotic prescriptions can not be called in or sent in electronically. He agreed to come to [facility] tomorrow at about 9:30am and register for a Nurse Visit to  the prescription. Dr. Aneta Cantu is working tomorrow. Routing this message to her for review.  Polly Farr RN

## 2017-03-02 ENCOUNTER — OFFICE VISIT (OUTPATIENT)
Dept: FAMILY MEDICINE CLINIC | Age: 37
End: 2017-03-02

## 2017-03-02 ENCOUNTER — CLINICAL SUPPORT (OUTPATIENT)
Dept: FAMILY MEDICINE CLINIC | Age: 37
End: 2017-03-02

## 2017-03-02 ENCOUNTER — TELEPHONE (OUTPATIENT)
Dept: FAMILY MEDICINE CLINIC | Age: 37
End: 2017-03-02

## 2017-03-02 DIAGNOSIS — G89.29 CHRONIC LOW BACK PAIN, UNSPECIFIED BACK PAIN LATERALITY, WITH SCIATICA PRESENCE UNSPECIFIED: Primary | ICD-10-CM

## 2017-03-02 DIAGNOSIS — M54.5 CHRONIC LOW BACK PAIN, UNSPECIFIED BACK PAIN LATERALITY, WITH SCIATICA PRESENCE UNSPECIFIED: ICD-10-CM

## 2017-03-02 DIAGNOSIS — Z71.89 COUNSELING AND COORDINATION OF CARE: Primary | ICD-10-CM

## 2017-03-02 DIAGNOSIS — Z71.9 COUNSELED BY NURSE: Primary | ICD-10-CM

## 2017-03-02 DIAGNOSIS — G89.29 CHRONIC LOW BACK PAIN, UNSPECIFIED BACK PAIN LATERALITY, WITH SCIATICA PRESENCE UNSPECIFIED: ICD-10-CM

## 2017-03-02 DIAGNOSIS — M54.5 CHRONIC LOW BACK PAIN, UNSPECIFIED BACK PAIN LATERALITY, WITH SCIATICA PRESENCE UNSPECIFIED: Primary | ICD-10-CM

## 2017-03-02 RX ORDER — TRAMADOL HYDROCHLORIDE 50 MG/1
50 TABLET ORAL
Qty: 15 TAB | Refills: 0 | Status: SHIPPED | OUTPATIENT
Start: 2017-03-02 | End: 2017-03-02 | Stop reason: SDUPTHER

## 2017-03-02 RX ORDER — TRAMADOL HYDROCHLORIDE 50 MG/1
50 TABLET ORAL
Qty: 15 TAB | Refills: 0 | Status: SHIPPED | OUTPATIENT
Start: 2017-03-02 | End: 2017-03-06 | Stop reason: SDUPTHER

## 2017-03-02 NOTE — PROGRESS NOTES
Pt states that he saw neuro surgeon and that the specialist would not rx narcotics at this time. He called nurse and asked for a rx for pain medication.  Will do tramadol #15 no refill

## 2017-03-02 NOTE — PROGRESS NOTES
Statements below are by Calvin Aaron RN. Pt here to  written prescription for Tramadol. Pt explained how to take rx, side effects, Pt verbalized understanding and denies questions. Pt ok with prescription and had no questions. Pt reminded her will see  at [facility] on 3/6/17,Pt verbalized understanding and denies questions.

## 2017-03-06 ENCOUNTER — TELEPHONE (OUTPATIENT)
Dept: FAMILY MEDICINE CLINIC | Age: 37
End: 2017-03-06

## 2017-03-06 DIAGNOSIS — M54.5 CHRONIC LOW BACK PAIN, UNSPECIFIED BACK PAIN LATERALITY, WITH SCIATICA PRESENCE UNSPECIFIED: ICD-10-CM

## 2017-03-06 DIAGNOSIS — G89.29 CHRONIC LOW BACK PAIN, UNSPECIFIED BACK PAIN LATERALITY, WITH SCIATICA PRESENCE UNSPECIFIED: ICD-10-CM

## 2017-03-06 RX ORDER — TRAMADOL HYDROCHLORIDE 50 MG/1
50 TABLET ORAL
Qty: 34 TAB | Refills: 0 | OUTPATIENT
Start: 2017-03-06 | End: 2017-06-10

## 2017-03-06 NOTE — TELEPHONE ENCOUNTER
Dr. Cha has this patient scheduled for 2:00 PM today, 3/6, but he wants to know if he should reschedule it since he will be going to [facility] tomorrow for an appointment. Please call him.     Leopold Caroline April

## 2017-03-06 NOTE — TELEPHONE ENCOUNTER
Call returned and pt states he has a pre op surgery appt at [facility] on 3/8/17 and surgery date for 3/28/17. He does not think he needs to see Dr Papito Han today and is asking to cancel his appt today at 2pm. I will cancel the appt for today. Pt asking for more Tramadol refills, he is taking 2 Tramadol daily. \"The surgery doctor will not order pain medicine until after the surgery and my back is hurting very badly\". States he is taking the gabapentin and the diclofenac as ordered.

## 2017-03-07 NOTE — TELEPHONE ENCOUNTER
Tramadol refill called into [address] on [address] Manoj Choi order for  Tramadol 50 mg disp 34 tabs no refills. Pt called and informed of refill and will  tomorrow. Grateful for assistance.

## 2017-03-09 ENCOUNTER — TELEPHONE (OUTPATIENT)
Dept: FAMILY MEDICINE CLINIC | Age: 37
End: 2017-03-09

## 2017-03-09 NOTE — TELEPHONE ENCOUNTER
Late entry for note received from Dr Peterson Mueller on 3/6/17:    MD ANGELA [facility] Nurses                     Can you contact this pt and let him know we are canceling appt for tomorrow since he has f/u with neurosurg and received pain med from our provider yesterday? I didn't receive [facility] neurosurg whole note stating plan of surgery and when f/u and surgery are, can you try to get? Spoke to [facility] medical records dept to get office visit note for mutual patient from his consultation with neurosurgery. Request was faxed to the medical records department with pt name and  after representative stated to fax them a sheet with the patient and their identifiers on it. Was faxed and fax confirmation received. Record has not been received as of yet. Dr Peterson Mueller notified that this was requested. Closing encounter.

## 2017-04-17 ENCOUNTER — TELEPHONE (OUTPATIENT)
Dept: FAMILY MEDICINE CLINIC | Age: 37
End: 2017-04-17

## 2017-04-17 NOTE — TELEPHONE ENCOUNTER
Nina from [address] [place] [address] left message asking for confirmation of dates of service between 7/2016 and 1/2017 for the patient being seen for 'lumbar spine pain' on the [facility]. We have no release of records form signed by the patient on file. Spoke to YANCI Gallegos, [address] [facility] [address] manager who said we will need signed release of records from pt. Called Nnia back, left detailed message on her VM with [place] [address] office fax # requested she fax form in order to obtain this information.

## 2017-06-10 ENCOUNTER — HOSPITAL ENCOUNTER (EMERGENCY)
Age: 37
Discharge: HOME | End: 2017-06-10
Attending: EMERGENCY MEDICINE
Payer: SUBSIDIZED

## 2017-06-10 VITALS
WEIGHT: 189 LBS | TEMPERATURE: 99 F | OXYGEN SATURATION: 95 % | HEIGHT: 69 IN | RESPIRATION RATE: 18 BRPM | BODY MASS INDEX: 27.99 KG/M2 | SYSTOLIC BLOOD PRESSURE: 128 MMHG | HEART RATE: 66 BPM | DIASTOLIC BLOOD PRESSURE: 86 MMHG

## 2017-06-10 DIAGNOSIS — R07.9 ACUTE CHEST PAIN: ICD-10-CM

## 2017-06-10 DIAGNOSIS — I49.1 PAC (PREMATURE ATRIAL CONTRACTION): ICD-10-CM

## 2017-06-10 DIAGNOSIS — R00.2 PALPITATIONS: Primary | ICD-10-CM

## 2017-06-10 LAB
ANION GAP BLD CALC-SCNC: 9 MMOL/L (ref 5–15)
BASOPHILS # BLD AUTO: 0 K/UL (ref 0–0.1)
BASOPHILS # BLD: 0 % (ref 0–1)
BUN SERPL-MCNC: 21 MG/DL (ref 6–20)
BUN/CREAT SERPL: 20 (ref 12–20)
CALCIUM SERPL-MCNC: 8.9 MG/DL (ref 8.5–10.1)
CHLORIDE SERPL-SCNC: 102 MMOL/L (ref 97–108)
CO2 SERPL-SCNC: 28 MMOL/L (ref 21–32)
CREAT SERPL-MCNC: 1.04 MG/DL (ref 0.7–1.3)
EOSINOPHIL # BLD: 0.1 K/UL (ref 0–0.4)
EOSINOPHIL NFR BLD: 1 % (ref 0–7)
ERYTHROCYTE [DISTWIDTH] IN BLOOD BY AUTOMATED COUNT: 12.4 % (ref 11.5–14.5)
GLUCOSE SERPL-MCNC: 109 MG/DL (ref 65–100)
HCT VFR BLD AUTO: 40.3 % (ref 36.6–50.3)
HGB BLD-MCNC: 14.2 G/DL (ref 12.1–17)
LYMPHOCYTES # BLD AUTO: 42 % (ref 12–49)
LYMPHOCYTES # BLD: 3.1 K/UL (ref 0.8–3.5)
MAGNESIUM SERPL-MCNC: 2 MG/DL (ref 1.6–2.4)
MCH RBC QN AUTO: 30.3 PG (ref 26–34)
MCHC RBC AUTO-ENTMCNC: 35.2 G/DL (ref 30–36.5)
MCV RBC AUTO: 86.1 FL (ref 80–99)
MONOCYTES # BLD: 0.6 K/UL (ref 0–1)
MONOCYTES NFR BLD AUTO: 7 % (ref 5–13)
NEUTS SEG # BLD: 3.7 K/UL (ref 1.8–8)
NEUTS SEG NFR BLD AUTO: 50 % (ref 32–75)
PLATELET # BLD AUTO: 328 K/UL (ref 150–400)
POTASSIUM SERPL-SCNC: 3.6 MMOL/L (ref 3.5–5.1)
RBC # BLD AUTO: 4.68 M/UL (ref 4.1–5.7)
SODIUM SERPL-SCNC: 139 MMOL/L (ref 136–145)
TROPONIN I SERPL-MCNC: <0.04 NG/ML
TSH SERPL DL<=0.05 MIU/L-ACNC: 0.58 UIU/ML (ref 0.36–3.74)
WBC # BLD AUTO: 7.5 K/UL (ref 4.1–11.1)

## 2017-06-10 PROCEDURE — 36415 COLL VENOUS BLD VENIPUNCTURE: CPT | Performed by: EMERGENCY MEDICINE

## 2017-06-10 PROCEDURE — 84484 ASSAY OF TROPONIN QUANT: CPT | Performed by: EMERGENCY MEDICINE

## 2017-06-10 PROCEDURE — 93005 ELECTROCARDIOGRAM TRACING: CPT

## 2017-06-10 PROCEDURE — 85025 COMPLETE CBC W/AUTO DIFF WBC: CPT | Performed by: EMERGENCY MEDICINE

## 2017-06-10 PROCEDURE — 80048 BASIC METABOLIC PNL TOTAL CA: CPT | Performed by: EMERGENCY MEDICINE

## 2017-06-10 PROCEDURE — 83735 ASSAY OF MAGNESIUM: CPT | Performed by: EMERGENCY MEDICINE

## 2017-06-10 PROCEDURE — 94762 N-INVAS EAR/PLS OXIMTRY CONT: CPT

## 2017-06-10 PROCEDURE — 84443 ASSAY THYROID STIM HORMONE: CPT | Performed by: EMERGENCY MEDICINE

## 2017-06-10 PROCEDURE — 99284 EMERGENCY DEPT VISIT MOD MDM: CPT

## 2017-06-11 NOTE — DISCHARGE INSTRUCTIONS
We hope that we have addressed all of your medical concerns. The examination and treatment you received in the Emergency Department were for an emergent problem and were not intended as complete care. It is important that you follow up with your healthcare provider(s) for ongoing care. If your symptoms worsen or do not improve as expected, and you are unable to reach your usual health care provider(s), you should return to the Emergency Department. Today's healthcare is undergoing tremendous change, and patient satisfaction surveys are one of the many tools to assess the quality of medical care. You may receive a survey from the [employer] regarding your experience in the Emergency Department. I hope that your experience has been completely positive, particularly the medical care that I provided. As such, please participate in the survey; anything less than excellent does not meet my expectations or intentions. [facility] and 8 [facility] participate in nationally recognized quality of care measures. If your blood pressure is greater than 120/80, as reported below, we urge that you seek medical care to address the potential of high blood pressure, commonly known as hypertension. Hypertension can be hereditary or can be caused by certain medical conditions, pain, stress, or \"white coat syndrome. \"       Please make an appointment with your health care provider(s) for follow up of your Emergency Department visit. VITALS:   Patient Vitals for the past 8 hrs:   Temp Pulse Resp BP SpO2   06/10/17 2200 - 71 21 113/86 94 %   06/10/17 2145 - 66 18 125/88 92 %   06/10/17 2130 - 68 15 123/87 -   06/10/17 2115 - 73 19 (!) 122/99 97 %   06/10/17 2100 - 82 20 (!) 143/94 98 %   06/10/17 2052 99 °F (37.2 °C) 64 14 124/84 98 %          Thank you for allowing us to provide you with medical care today.   We realize that you have many choices for your emergency care needs. Please choose us in the future for any continued health care needs. Jaja Casey MD    [facility], [facility].   Office: [phone]            Recent Results (from the past 24 hour(s))   CBC WITH AUTOMATED DIFF    Collection Time: 06/10/17  9:37 PM   Result Value Ref Range    WBC 7.5 4.1 - 11.1 K/uL    RBC 4.68 4.10 - 5.70 M/uL    HGB 14.2 12.1 - 17.0 g/dL    HCT 40.3 36.6 - 50.3 %    MCV 86.1 80.0 - 99.0 FL    MCH 30.3 26.0 - 34.0 PG    MCHC 35.2 30.0 - 36.5 g/dL    RDW 12.4 11.5 - 14.5 %    PLATELET 771 576 - 554 K/uL    NEUTROPHILS 50 32 - 75 %    LYMPHOCYTES 42 12 - 49 %    MONOCYTES 7 5 - 13 %    EOSINOPHILS 1 0 - 7 %    BASOPHILS 0 0 - 1 %    ABS. NEUTROPHILS 3.7 1.8 - 8.0 K/UL    ABS. LYMPHOCYTES 3.1 0.8 - 3.5 K/UL    ABS. MONOCYTES 0.6 0.0 - 1.0 K/UL    ABS. EOSINOPHILS 0.1 0.0 - 0.4 K/UL    ABS. BASOPHILS 0.0 0.0 - 0.1 K/UL   METABOLIC PANEL, BASIC    Collection Time: 06/10/17  9:37 PM   Result Value Ref Range    Sodium 139 136 - 145 mmol/L    Potassium 3.6 3.5 - 5.1 mmol/L    Chloride 102 97 - 108 mmol/L    CO2 28 21 - 32 mmol/L    Anion gap 9 5 - 15 mmol/L    Glucose 109 (H) 65 - 100 mg/dL    BUN 21 (H) 6 - 20 MG/DL    Creatinine 1.04 0.70 - 1.30 MG/DL    BUN/Creatinine ratio 20 12 - 20      GFR est AA >60 >60 ml/min/1.73m2    GFR est non-AA >60 >60 ml/min/1.73m2    Calcium 8.9 8.5 - 10.1 MG/DL   MAGNESIUM    Collection Time: 06/10/17  9:37 PM   Result Value Ref Range    Magnesium 2.0 1.6 - 2.4 mg/dL   TROPONIN I    Collection Time: 06/10/17  9:37 PM   Result Value Ref Range    Troponin-I, Qt. <0.04 <0.05 ng/mL   TSH 3RD GENERATION    Collection Time: 06/10/17  9:37 PM   Result Value Ref Range    TSH 0.58 0.36 - 3.74 uIU/mL       No results found. Chest Pain: Care Instructions  Your Care Instructions  There are many things that can cause chest pain. Some are not serious and will get better on their own in a few days.  But some kinds of chest pain need more testing and treatment. Your doctor may have recommended a follow-up visit in the next 8 to 12 hours. If you are not getting better, you may need more tests or treatment. Even though your doctor has released you, you still need to watch for any problems. The doctor carefully checked you, but sometimes problems can develop later. If you have new symptoms or if your symptoms do not get better, get medical care right away. If you have worse or different chest pain or pressure that lasts more than 5 minutes or you passed out (lost consciousness), call 911 or seek other emergency help right away. A medical visit is only one step in your treatment. Even if you feel better, you still need to do what your doctor recommends, such as going to all suggested follow-up appointments and taking medicines exactly as directed. This will help you recover and help prevent future problems. How can you care for yourself at home? · Rest until you feel better. · Take your medicine exactly as prescribed. Call your doctor if you think you are having a problem with your medicine. · Do not drive after taking a prescription pain medicine. When should you call for help? Call 911 if:  · You passed out (lost consciousness). · You have severe difficulty breathing. · You have symptoms of a heart attack. These may include:  ¨ Chest pain or pressure, or a strange feeling in your chest.  ¨ Sweating. ¨ Shortness of breath. ¨ Nausea or vomiting. ¨ Pain, pressure, or a strange feeling in your back, neck, jaw, or upper belly or in one or both shoulders or arms. ¨ Lightheadedness or sudden weakness. ¨ A fast or irregular heartbeat. After you call 911, the  may tell you to chew 1 adult-strength or 2 to 4 low-dose aspirin. Wait for an ambulance. Do not try to drive yourself. Call your doctor today if:  · You have any trouble breathing. · Your chest pain gets worse.   · You are dizzy or lightheaded, or you feel like you may faint. · You are not getting better as expected. · You are having new or different chest pain. Where can you learn more? Go to http://ariana-jose g.info/. Enter A120 in the search box to learn more about \"Chest Pain: Care Instructions. \"  Current as of: May 27, 2016  Content Version: 11.2  © 7841-5012 [employer]. Care instructions adapted under license by [employer] (which disclaims liability or warranty for this information). If you have questions about a medical condition or this instruction, always ask your healthcare professional. [place] [address] any warranty or liability for your use of this information. Palpitations: Care Instructions  Your Care Instructions    Heart palpitations are the uncomfortable sensation that your heart is beating fast or irregularly. You might feel pounding or fluttering in your chest. It might feel like your heart is skipping a beat. Although palpitations may be caused by a heart problem, they also occur because of stress, fatigue, or use of alcohol, caffeine, or nicotine. Many medicines, including diet pills, antihistamines, decongestants, and some herbal products, can cause heart palpitations. Nearly everyone has palpitations from time to time. Depending on your symptoms, your doctor may need to do more tests to try to find the cause of your palpitations. Follow-up care is a key part of your treatment and safety. Be sure to make and go to all appointments, and call your doctor if you are having problems. It's also a good idea to know your test results and keep a list of the medicines you take. How can you care for yourself at home? · Avoid caffeine, nicotine, and excess alcohol. · Do not take illegal drugs, such as methamphetamines and cocaine. · Do not take weight loss or diet medicines unless you talk with your doctor first.  · Get plenty of sleep. · Do not overeat.   · If you have palpitations again, take deep breaths and try to relax. This may slow a racing heart. · If you start to feel lightheaded, lie down to avoid injuries that might result if you pass out and fall down. · Keep a record of your palpitations and bring it to your next doctor's appointment. Write down:  ¨ The date and time. ¨ Your pulse. (If your heart is beating fast, it may be hard to count your pulse.)  ¨ What you were doing when the palpitations started. ¨ How long the palpitations lasted. ¨ Any other symptoms. · If an activity causes palpitations, slow down or stop. Talk to your doctor before you do that activity again. · Take your medicines exactly as prescribed. Call your doctor if you think you are having a problem with your medicine. When should you call for help? Call 911 anytime you think you may need emergency care. For example, call if:  · You passed out (lost consciousness). · You have symptoms of a heart attack. These may include:  ¨ Chest pain or pressure, or a strange feeling in the chest.  ¨ Sweating. ¨ Shortness of breath. ¨ Pain, pressure, or a strange feeling in the back, neck, jaw, or upper belly or in one or both shoulders or arms. ¨ Lightheadedness or sudden weakness. ¨ A fast or irregular heartbeat. After you call 911, the  may tell you to chew 1 adult-strength or 2 to 4 low-dose aspirin. Wait for an ambulance. Do not try to drive yourself. · You have symptoms of a stroke. These may include:  ¨ Sudden numbness, tingling, weakness, or loss of movement in your face, arm, or leg, especially on only one side of your body. ¨ Sudden vision changes. ¨ Sudden trouble speaking. ¨ Sudden confusion or trouble understanding simple statements. ¨ Sudden problems with walking or balance. ¨ A sudden, severe headache that is different from past headaches.   Call your doctor now or seek immediate medical care if:  · You have heart palpitations and:  ¨ Are dizzy or lightheaded, or you feel like you may faint. ¨ Have new or increased shortness of breath. Watch closely for changes in your health, and be sure to contact your doctor if:  · You continue to have heart palpitations. Where can you learn more? Go to http://ariana-jose g.info/. Enter R508 in the search box to learn more about \"Palpitations: Care Instructions. \"  Current as of: January 27, 2016  Content Version: 11.2  © 0833-6480 [employer]. Care instructions adapted under license by [employer] (which disclaims liability or warranty for this information). If you have questions about a medical condition or this instruction, always ask your healthcare professional. [place] [address] any warranty or liability for your use of this information. Premature Heartbeat: Care Instructions  Your Care Instructions    A premature heartbeat happens when the heart beats earlier than it should. This briefly interrupts the heart's rhythm. You do not usually feel the early heartbeat, and the next beat is stronger. To many people, this feels like a skipped heartbeat or a flutter. If you have no heart problems, premature heartbeats are not a cause for concern. Most people have them at some time. They may happen more often if you drink a lot of caffeine or alcohol or are under stress. Usually, no cause for a premature heartbeat is found, and no treatment is needed. Follow-up care is a key part of your treatment and safety. Be sure to make and go to all appointments, and call your doctor if you are having problems. It's also a good idea to know your test results and keep a list of the medicines you take. How can you care for yourself at home? · Limit caffeine and other stimulants if they trigger premature heartbeats. · Reduce stress. Avoid people and places that make you feel anxious, if you can. Learn ways to reduce stress, such as biofeedback, guided imagery, and meditation.   · Do not smoke or allow others to smoke around you. If you need help quitting, talk to your doctor about stop-smoking programs and medicines. These can increase your chances of quitting for good. · Get at least 30 minutes of exercise on most days of the week. Walking is a good choice. You also may want to do other activities, such as running, swimming, cycling, or playing tennis or team sports. · Get enough sleep. Keep your room dark and quiet, and try to go to bed at the same time every night. · Limit alcohol to 2 drinks a day for men and 1 drink a day for women. Too much alcohol can cause health problems. If drinking alcohol causes more premature heartbeats, do not drink it. · If your doctor prescribes medicine, take it exactly as prescribed. Call your doctor if you think you are having a problem with your medicine. When should you call for help? Call 911 anytime you think you may need emergency care. For example, call if:  · You passed out (lost consciousness). · You have severe shortness of breath. Call your doctor now or seek immediate medical care if:  · You have a heart rate that stays above 120 beats per minute for more than a few minutes. · You are suddenly dizzy. Watch closely for changes in your health, and be sure to contact your doctor if you have any problems. Where can you learn more? Go to http://ariana-jose g.info/. Enter K017 in the search box to learn more about \"Premature Heartbeat: Care Instructions. \"  Current as of: January 27, 2016  Content Version: 11.2  © 5561-9164 [employer]. Care instructions adapted under license by [employer] (which disclaims liability or warranty for this information). If you have questions about a medical condition or this instruction, always ask your healthcare professional. [address] any warranty or liability for your use of this information.

## 2017-06-11 NOTE — ED PROVIDER NOTES
HPI Comments: 29 y.o. male with significant past medical history for herniated disc with back surgery March 2017 who presents from home with chief complaint of heart palpitaitons. Pt complains of intermittent heart palpitations occurring about every 2 hours for over 1 week. Pt notes palpitations are worse in the early morning and when he lays flat; today pt reports palpitations that became more frequent occurring about every 30 minutes. Additionally, pt reports one episode of CP 2 nights ago when he was laying on his left side, but describes resolved pain. Pt reports first noticing heart palpitations after  starting multivitamin a couple weeks ago, so pt discontinued supplement ~1 week ago but reports persisting palpitations. Pt reports discontinuing coffee and denies any other caffeine. Pt denies hx of thyroid problems. Pt denies CP or SOB with exertion. Pt denies pain with deep breathing. Pt denies abdominal pain. There are no other acute medical concerns at this time. Social hx: No tobacco use; occasional EtOH. PCP: None    Note written by Olga Lidia. Coco Chan, as dictated by Angela Mora MD 9:19 PM      The history is provided by the patient. No  was used. No past medical history on file. No past surgical history on file. No family history on file. Social History     Social History    Marital status:      Spouse name: N/A    Number of children: N/A    Years of education: N/A     Occupational History    Not on file. Social History Main Topics    Smoking status: Never Smoker    Smokeless tobacco: Not on file    Alcohol use Yes      Comment: occ.  Drug use: No    Sexual activity: Not on file     Other Topics Concern    Not on file     Social History Narrative         ALLERGIES: Lidocaine    Review of Systems   Constitutional: Negative for chills and fever. HENT: Negative for ear pain and sore throat.     Eyes: Negative for photophobia and pain.   Respiratory: Negative for chest tightness and shortness of breath. Cardiovascular: Positive for palpitations. Negative for chest pain and leg swelling. Gastrointestinal: Negative for abdominal pain, nausea and vomiting. Genitourinary: Negative for dysuria and flank pain. Musculoskeletal: Negative for back pain and neck pain. Skin: Negative for rash and wound. Neurological: Negative for dizziness, light-headedness and headaches. Vitals:    06/10/17 2052   BP: 124/84   Pulse: 64   Resp: 14   Temp: 99 °F (37.2 °C)   SpO2: 98%   Weight: 85.7 kg (189 lb)   Height: 5' 9\" (1.753 m)            Physical Exam   Constitutional: He is oriented to person, place, and time. He appears well-developed and well-nourished. No distress. HENT:   Head: Normocephalic and atraumatic. Eyes: EOM are normal. Pupils are equal, round, and reactive to light. Neck: Normal range of motion. Neck supple. Cardiovascular: Normal rate, regular rhythm, normal heart sounds and intact distal pulses. Exam reveals no friction rub. No murmur heard. Pulmonary/Chest: Effort normal and breath sounds normal. No respiratory distress. He has no wheezes. He has no rales. He exhibits no tenderness. Abdominal: Soft. Bowel sounds are normal. He exhibits no distension. There is no tenderness. There is no rebound and no guarding. Musculoskeletal: Normal range of motion. He exhibits no edema or tenderness. Neurological: He is alert and oriented to person, place, and time. No cranial nerve deficit. Coordination normal.   Skin: Skin is warm and dry. He is not diaphoretic. No pallor. Psychiatric: He has a normal mood and affect. His behavior is normal.   Nursing note and vitals reviewed.        MDM  Number of Diagnoses or Management Options  Acute chest pain:   PAC (premature atrial contraction):   Palpitations:   Diagnosis management comments: Palpitations- check electrolytes, tsh monitor    Cp- last episode Wednesday and not worse with exertion sounds muscular. Will check 1 troponin but unlikely acs as better with walking when he had it and felt like a pulled muscle. No concern for PE       Amount and/or Complexity of Data Reviewed  Clinical lab tests: ordered and reviewed  Independent visualization of images, tracings, or specimens: yes (ekg)    Patient Progress  Patient progress: stable    ED Course       Procedures    EKG interpretation: (Preliminary)  Rhythm: normal sinus rhythm; and regular . Rate (approx.): 60; Axis: normal; P wave: normal; QRS interval: normal ; ST/T wave: non-specific changes;       10:45 PM  Pt with a few pacs on telemetry. Discussed with pt need for follow up and return if worsening sx.  He will follow up with cardiology

## 2017-06-11 NOTE — ED TRIAGE NOTES
Palpitations times several weeks. Recently on some supplements he thought caused it so he stopped taking them but it did not get better. Worse when lying on left side and in the morning.

## 2017-06-12 LAB
ATRIAL RATE: 63 BPM
CALCULATED P AXIS, ECG09: 34 DEGREES
CALCULATED R AXIS, ECG10: 60 DEGREES
CALCULATED T AXIS, ECG11: 44 DEGREES
DIAGNOSIS, 93000: NORMAL
P-R INTERVAL, ECG05: 164 MS
Q-T INTERVAL, ECG07: 370 MS
QRS DURATION, ECG06: 94 MS
QTC CALCULATION (BEZET), ECG08: 378 MS
VENTRICULAR RATE, ECG03: 63 BPM

## 2017-06-15 ENCOUNTER — OFFICE VISIT (OUTPATIENT)
Dept: CARDIOLOGY CLINIC | Age: 37
End: 2017-06-15

## 2017-06-15 VITALS
HEIGHT: 69 IN | SYSTOLIC BLOOD PRESSURE: 110 MMHG | HEART RATE: 60 BPM | BODY MASS INDEX: 27.4 KG/M2 | WEIGHT: 185 LBS | DIASTOLIC BLOOD PRESSURE: 70 MMHG

## 2017-06-15 DIAGNOSIS — R00.2 PALPITATIONS: Primary | ICD-10-CM

## 2017-06-15 DIAGNOSIS — R07.9 CHEST PAIN, UNSPECIFIED TYPE: ICD-10-CM

## 2017-06-15 NOTE — MR AVS SNAPSHOT
Visit Information Date & Time Provider Department Dept. Phone Encounter #  
 6/15/2017 10:00 AM Jaspreet Henderson MD [facility] [phone] [id number] Upcoming Health Maintenance Date Due DTaP/Tdap/Td series (1 - Tdap) 9/2/2002 INFLUENZA AGE 9 TO ADULT 8/1/2017 Allergies as of 6/15/2017  Review Complete On: 6/15/2017 By: Jaspreet Henderson MD  
  
 Severity Noted Reaction Type Reactions Lidocaine  01/27/2017    Palpitations Other reaction(s): sweating, nausea, dizzy Current Immunizations  Never Reviewed No immunizations on file. Not reviewed this visit You Were Diagnosed With   
  
 Codes Comments Palpitations    -  Primary ICD-10-CM: R00.2 ICD-9-CM: 785.1 Chest pain, unspecified type     ICD-10-CM: R07.9 ICD-9-CM: 786.50 Vitals BP Pulse Height(growth percentile) Weight(growth percentile) BMI Smoking Status 110/70 60 5' 9\" (1.753 m) 185 lb (83.9 kg) 27.32 kg/m2 Never Smoker Vitals History BMI and BSA Data Body Mass Index Body Surface Area  
 27.32 kg/m 2 2.02 m 2 Preferred Pharmacy Pharmacy Name Phone [facility] [address] [facility] [phone] Your Updated Medication List  
  
   
This list is accurate as of: 6/15/17 10:41 AM.  Always use your most recent med list.  
  
  
  
  
 diclofenac EC 75 mg EC tablet Commonly known as:  VOLTAREN Take 1 Tab by mouth two (2) times a day. We Performed the Following LIPID PANEL [80618 CPT(R)] Patient Instructions Heart-Healthy Diet: Care Instructions Your Care Instructions A heart-healthy diet has lots of vegetables, fruits, nuts, beans, and whole grains, and is low in salt. It limits foods that are high in saturated fat, such as meats, cheeses, and fried foods. It may be hard to change your diet, but even small changes can lower your risk of heart attack and heart disease. Follow-up care is a key part of your treatment and safety. Be sure to make and go to all appointments, and call your doctor if you are having problems. It's also a good idea to know your test results and keep a list of the medicines you take. How can you care for yourself at home? Watch your portions · Learn what a serving is. A \"serving\" and a \"portion\" are not always the same thing. Make sure that you are not eating larger portions than are recommended. For example, a serving of pasta is ½ cup. A serving size of meat is 2 to 3 ounces. A 3-ounce serving is about the size of a deck of cards. Measure serving sizes until you are good at [place]" them. Keep in mind that restaurants often serve portions that are 2 or 3 times the size of one serving. · To keep your energy level up and keep you from feeling hungry, eat often but in smaller portions. · Eat only the number of calories you need to stay at a healthy weight. If you need to lose weight, eat fewer calories than your body burns (through exercise and other physical activity). Eat more fruits and vegetables · Eat a variety of fruit and vegetables every day. Dark green, deep orange, red, or yellow fruits and vegetables are especially good for you. Examples include spinach, carrots, peaches, and berries. · Keep carrots, celery, and other veggies handy for snacks. Buy fruit that is in season and store it where you can see it so that you will be tempted to eat it. · Cook dishes that have a lot of veggies in them, such as stir-fries and soups. Limit saturated and trans fat · Read food labels, and try to avoid saturated and trans fats. They increase your risk of heart disease. Trans fat is found in many processed foods such as cookies and crackers. · Use olive or canola oil when you cook. Try cholesterol-lowering spreads, such as Benecol or Take Control. · Bake, broil, grill, or steam foods instead of frying them. · Choose lean meats instead of high-fat meats such as hot dogs and sausages. Cut off all visible fat when you prepare meat. · Eat fish, skinless poultry, and meat alternatives such as soy products instead of high-fat meats. Soy products, such as tofu, may be especially good for your heart. · Choose low-fat or fat-free milk and dairy products. Eat fish · Eat at least two servings of fish a week. Certain fish, such as salmon and tuna, contain omega-3 fatty acids, which may help reduce your risk of heart attack. Eat foods high in fiber · Eat a variety of grain products every day. Include whole-grain foods that have lots of fiber and nutrients. Examples of whole-grain foods include oats, whole wheat bread, and brown rice. · Buy whole-grain breads and cereals, instead of white bread or pastries. Limit salt and sodium · Limit how much salt and sodium you eat to help lower your blood pressure. · Taste food before you salt it. Add only a little salt when you think you need it. With time, your taste buds will adjust to less salt. · Eat fewer snack items, fast foods, and other high-salt, processed foods. Check food labels for the amount of sodium in packaged foods. · Choose low-sodium versions of canned goods (such as soups, vegetables, and beans). Limit sugar · Limit drinks and foods with added sugar. These include candy, desserts, and soda pop. Limit alcohol · Limit alcohol to no more than 2 drinks a day for men and 1 drink a day for women. Too much alcohol can cause health problems. When should you call for help? Watch closely for changes in your health, and be sure to contact your doctor if: 
· You would like help planning heart-healthy meals. Where can you learn more? Go to http://ariana-jose g.info/. Enter V137 in the search box to learn more about \"Heart-Healthy Diet: Care Instructions. \" Current as of: January 27, 2016 Content Version: 11.2 © 1979-4255 [employer]wise, [employer]orporated. Care instructions adapted under license by [employer] (which disclaims liability or warranty for this information). If you have questions about a medical condition or this instruction, always ask your healthcare professional. [address] any warranty or liability for your use of this information. Introducing \[facility] [place] [facility]\"" & HEALTH SERVICES! Dear Kaylin Jorgensen: Thank you for requesting a Embedded Chat account. Our records indicate that you already have an active Embedded Chat account. You can access your account anytime at [email or web address] Did you know that you can access your hospital and ER discharge instructions at any time in Embedded Chat? You can also review all of your test results from your hospital stay or ER visit. Additional Information If you have questions, please visit the Frequently Asked Questions section of the Embedded Chat website at [email or web address] Remember, Embedded Chat is NOT to be used for urgent needs. For medical emergencies, dial 911. Now available from your iPhone and Android! Please provide this summary of care documentation to your next provider. Your primary care clinician is listed as NONE. If you have any questions after today's visit, please call [phone].

## 2017-06-15 NOTE — PATIENT INSTRUCTIONS

## 2017-06-15 NOTE — PROGRESS NOTES
Inessa Jamison MD. [facility] [place]              Patient: Chandrakant Nair  : 1980      Today's Date: 6/15/2017          HISTORY OF PRESENT ILLNESS:     History of Present Illness:  Mr. Kaitlin Pavon is referred for palpitations from the ER (Dr. Tarun Acosta). Records reviewed. Her note from 6/10/17 states \"Pt complains of intermittent heart palpitations occurring about every 2 hours for over 1 week. Pt notes palpitations are worse in the early morning and when he lays flat; today pt reports palpitations that became more frequent occurring about every 30 minutes. Additionally, pt reports one episode of CP 2 nights ago when he was laying on his left side, but describes resolved pain. Pt reports first noticing heart palpitations after starting multivitamin a couple weeks ago, so pt discontinued supplement ~1 week ago but reports persisting palpitations. Pt reports discontinuing coffee and denies any other caffeine. \". Labwork and EKG were normal in the ER. There were just a few PVC's noted on telemetry. Mr. Kaitlin Pavon says he started a MVI a couple of weeks and noticed some extra beats but problems persist off of it. Notices it more when he lays on his left side. CP only when he lies a certain way - but no more CP. He notices palpitations every day - notes it more at night time when he lays down. No problems with this in past.  No CP or SOB otherwise. PAST MEDICAL HISTORY:     Past Medical History:   Diagnosis Date    Bronchitis     Previous back surgery     herniated disc with back surgery 2017          Past Surgical History:   Procedure Laterality Date    HX ORTHOPAEDIC      herniated disk, 2017             MEDICATIONS:     Current Outpatient Prescriptions   Medication Sig Dispense Refill    diclofenac EC (VOLTAREN) 75 mg EC tablet Take 1 Tab by mouth two (2) times a day.  60 Tab 1         Allergies   Allergen Reactions    Lidocaine Palpitations     Other reaction(s): sweating, nausea, dizzy             SOCIAL HISTORY:     Social History   Substance Use Topics    Smoking status: Never Smoker    Smokeless tobacco: None    Alcohol use Yes      Comment: occ. FAMILY HISTORY:     Family History   Problem Relation Age of Onset    Other Mother      Back problems               REVIEW OF SYMPTOMS:     Review of Symptoms:  Constitutional: Negative for fever, chills  HEENT: Negative for nosebleeds, tinnitus, and vision changes. Respiratory: Negative for cough, wheezing  Cardiovascular: Negative for orthopnea, claudication, syncope, and PND. Gastrointestinal: Negative for abdominal pain, diarrhea, melena. Genitourinary: Negative for dysuria  Musculoskeletal: Negative for myalgias. Skin: Negative for rash  Heme: No problems bleeding. Neurological: Negative for speech change and focal weakness. PHYSICAL EXAM:     Physical Exam:  Visit Vitals    /70    Pulse 60    Ht 5' 9\" (1.753 m)    Wt 185 lb (83.9 kg)    BMI 27.32 kg/m2     Patient appears generally well, mood and affect are appropriate and pleasant. HEENT:  Hearing intact, non-icteric, normocephalic, atraumatic. Neck Exam: Supple, No JVD or carotid bruits. Lung Exam: Clear to auscultation, even breath sounds. Cardiac Exam: Regular rate and rhythm with no murmur  Abdomen: Soft, non-tender, normal bowel sounds. No bruits or masses. Extremities: Moves all ext well. No lower extremity edema. Vascular: 2+ dorsalis pedis pulses bilaterally.   Psych: Appropriate affect  Neuro - Grossly intact        LABS / OTHER STUDIES:         Lab Results   Component Value Date/Time    Sodium 139 06/10/2017 09:37 PM    Potassium 3.6 06/10/2017 09:37 PM    Chloride 102 06/10/2017 09:37 PM    CO2 28 06/10/2017 09:37 PM    Anion gap 9 06/10/2017 09:37 PM    Glucose 109 06/10/2017 09:37 PM    BUN 21 06/10/2017 09:37 PM    Creatinine 1.04 06/10/2017 09:37 PM    BUN/Creatinine ratio 20 06/10/2017 09:37 PM    GFR est AA >60 06/10/2017 09:37 PM    GFR est non-AA >60 06/10/2017 09:37 PM    Calcium 8.9 06/10/2017 09:37 PM       Lab Results   Component Value Date/Time    WBC 7.5 06/10/2017 09:37 PM    Hemoglobin (POC) 14.8 01/13/2017 10:11 AM    HGB 14.2 06/10/2017 09:37 PM    HCT 40.3 06/10/2017 09:37 PM    PLATELET 330 59/45/1007 09:37 PM    MCV 86.1 06/10/2017 09:37 PM       No results found for: CHOL, CHOLPOCT, CHOLX, CHLST, CHOLV, HDL, HDLPOC, LDL, LDLCPOC, LDLC, DLDLP, VLDLC, VLDL, TGLX, TRIGL, TRIGP, TGLPOCT, CHHD, CHHDX    Lab Results   Component Value Date/Time    TSH 0.58 06/10/2017 09:37 PM             CARDIAC DIAGNOSTICS:     Cardiac Evaluation Includes:    EKG 6/10/17 - NSR, normal           ASSESSMENT AND PLAN:     Assessment and Plan:  1) Daily Palpitations (maybe due to premature beats)   - check a Holter to assess further   - Check an Echo     2) Non-anginal CP  - check a treadmill stress test to rule out high risk findings     3) CV risk assessment   - check lipids     4) Phone FU after testing. See me back as needed. Patient expressed understanding of the plan - questions were answered. 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  with 3 kids. Camelia Clark MD, [address]  Ph: [phone]   Ph [phone]        ADDENDUM   6/29/2017  Treadmill Stress Test 6/28/17 - walked 12 min (17.2 METS), normal study. Rare PVC's gone at peak. Echo 6/28/17 - LVEF 60%, normal study     Will have nurse call with normal results thus far. Holter results pending. ADDENDUM   7/9/2017  Holter 6/29/17 - NSR, HR , Avg 68. Frequent PVC's (855 beats, 0.9%). Symptoms correlate with mostly sinus rhythm and once with a PVC. Will call pt.        ADDENDUM   7/10/2017  I called patient and gave him essentially normal results. He can exercise more and will let us know if any problems.

## 2017-06-16 LAB
CHOLEST SERPL-MCNC: 223 MG/DL (ref 100–199)
HDLC SERPL-MCNC: 30 MG/DL
INTERPRETATION, 910389: NORMAL
LDLC SERPL CALC-MCNC: 126 MG/DL (ref 0–99)
TRIGL SERPL-MCNC: 334 MG/DL (ref 0–149)
VLDLC SERPL CALC-MCNC: 67 MG/DL (ref 5–40)

## 2017-06-28 ENCOUNTER — CLINICAL SUPPORT (OUTPATIENT)
Dept: CARDIOLOGY CLINIC | Age: 37
End: 2017-06-28

## 2017-06-28 DIAGNOSIS — R00.2 PALPITATIONS: ICD-10-CM

## 2017-06-28 DIAGNOSIS — R07.9 CHEST PAIN, UNSPECIFIED TYPE: Primary | ICD-10-CM

## 2017-06-28 DIAGNOSIS — R00.2 PALPITATIONS: Primary | ICD-10-CM

## 2017-06-28 NOTE — PROGRESS NOTES
See scanned report. Dr. Marlon Isidro ordered study and Dr. Marlon Isidro read study. ID verified per protocol. Test and risks explained. Consent signed after all patient questions answered. Patient developed palpitation feelings early with exercise and gone at peak. Lower back discomfort at peak exercise during test. At 2 minutes in [facility], patient without symptoms or complaints voiced.

## 2017-06-28 NOTE — PROGRESS NOTES
Applied holter monitor for pt per Dr Chidi Constantino  Dx: palps. Pt has #[id number] & is due back on [facility] 6/29/17.

## 2022-03-18 PROBLEM — R07.9 CHEST PAIN: Status: ACTIVE | Noted: 2017-06-15

## 2022-03-19 PROBLEM — R00.2 PALPITATIONS: Status: ACTIVE | Noted: 2017-06-15

## 2024-04-29 ENCOUNTER — TRANSCRIBE ORDERS (OUTPATIENT)
Facility: HOSPITAL | Age: 44
End: 2024-04-29

## 2024-04-29 DIAGNOSIS — M54.12 CERVICAL RADICULOPATHY: Primary | ICD-10-CM

## 2024-04-30 ENCOUNTER — HOSPITAL ENCOUNTER (OUTPATIENT)
Facility: HOSPITAL | Age: 44
Discharge: HOME | End: 2024-05-03
Attending: PAIN MEDICINE
Payer: COMMERCIAL

## 2024-04-30 DIAGNOSIS — M54.12 CERVICAL RADICULOPATHY: ICD-10-CM

## 2024-04-30 PROCEDURE — 72141 MRI NECK SPINE W/O DYE: CPT
